# Patient Record
Sex: FEMALE | NOT HISPANIC OR LATINO | RURAL
[De-identification: names, ages, dates, MRNs, and addresses within clinical notes are randomized per-mention and may not be internally consistent; named-entity substitution may affect disease eponyms.]

---

## 2022-08-11 PROCEDURE — 88305 TISSUE EXAM BY PATHOLOGIST: CPT | Mod: SUR,59 | Performed by: DERMATOLOGY

## 2022-08-11 PROCEDURE — 88305 TISSUE EXAM BY PATHOLOGIST: CPT | Mod: 26,,, | Performed by: PATHOLOGY

## 2022-08-11 PROCEDURE — 88305 PATHOLOGY, DERMATOLOGY: ICD-10-PCS | Mod: 26,,, | Performed by: PATHOLOGY

## 2022-08-12 ENCOUNTER — LAB REQUISITION (OUTPATIENT)
Dept: LAB | Facility: HOSPITAL | Age: 47
End: 2022-08-12
Attending: DERMATOLOGY
Payer: MEDICARE

## 2022-08-12 DIAGNOSIS — D49.2 NEOPLASM OF UNSPECIFIED BEHAVIOR OF BONE, SOFT TISSUE, AND SKIN: ICD-10-CM

## 2022-08-12 DIAGNOSIS — L53.8 OTHER SPECIFIED ERYTHEMATOUS CONDITIONS: ICD-10-CM

## 2022-08-15 LAB
DHEA SERPL-MCNC: NORMAL
ESTROGEN SERPL-MCNC: NORMAL PG/ML
INSULIN SERPL-ACNC: NORMAL U[IU]/ML
LAB AP GROSS DESCRIPTION: NORMAL
LAB AP LABORATORY NOTES: NORMAL
LAB AP SPEC A DDX: NORMAL
LAB AP SPEC A MORPHOLOGY: NORMAL
LAB AP SPEC A PROCEDURE: NORMAL
LAB AP SPEC B DDX: NORMAL
LAB AP SPEC B MORPHOLOGY: NORMAL
LAB AP SPEC B PROCEDURE: NORMAL
T3RU NFR SERPL: NORMAL %

## 2022-08-16 DIAGNOSIS — G35 MULTIPLE SCLEROSIS: Primary | ICD-10-CM

## 2022-08-30 ENCOUNTER — CLINICAL SUPPORT (OUTPATIENT)
Dept: REHABILITATION | Facility: HOSPITAL | Age: 47
End: 2022-08-30
Payer: MEDICARE

## 2022-08-30 DIAGNOSIS — G35 MULTIPLE SCLEROSIS: ICD-10-CM

## 2022-08-30 PROCEDURE — 97542 WHEELCHAIR MNGMENT TRAINING: CPT

## 2022-08-30 NOTE — PLAN OF CARE
"[facility]  ASSISTIVE TECHNOLOGY EVALUATION    Date: 8/30/2022   Name: Alexus Mathew  Clinic Number: [id number]    Therapy Diagnosis:   Encounter Diagnosis   Name Primary?    Multiple sclerosis      Physician: Supriya Chacon MD    Physician Orders: PT Eval and Treat for wheelchair  Medical Diagnosis from Referral: MS  Evaluation Date: 8/30/2022  Visit # / Visits authorized: 1/ 1    Time In: 1100  Time Out: 1210  Total Appointment Time (timed & untimed codes): 70 minutes    Precautions: Standard    Past Medical History  Multiple sclerosis diagnosed in 2005, Brain surgery in 2004 due to her falling and balance issues, her function has signficantly decline over the past 3-5 years,  She is having multiple falls with associated injuries, back surgery lumbar fusion n 2020 with wound healing problems after surgery, Covid in 2020 with residual fatigue, rotator cuff tear due to fall resulting in surgery, meniscal tear due to fall with associated surgery,  Raynauds in her right foot resulting in edema in her right LE    Pain at time of evaluation: 4/10 Tolerable  Pain at worst: 10/10 Worst Pain Imaginable  Pain at rest: 3/10 Tolerable    Factors that increase pain:   standing and walking    Factors that decrease pain:  rest    Current Durable Medical Equipment  MWC, POV, rollator, grab bars, BSC,     Home environment  Patient lives with her  in a tradtitional home with a threshold at entrance, and tile floors with carpet in bedrooms,   Interior door width: 36"  Exterior door width: 36"  Comments: patient built home mostly handicapped accessible     Patient's mobility complaints: patient with very frequent falls with near falls every single day.  She cannot push herself in a manual wheelchair due to UE weakness with the right arm weaker than her left. She is unsafe with her transfers and is fearful of trying to get up alone, but her  works outside of the home and she is alone for a portion of " the day every weekday.  She cannot get to the bathroom in time to prevent accidents many times, and she delays trying to go to the bathroom because of fear of falling with standing and transfers.     Objective Evaluation:    ROM Right upper extremity  Left upper extremity   Right lower extremity  Left lower extremity    Shoulder flexion  95 120 Hip flexion 95 100   Shoulder IR/ER WFL WFL Hip extension  0 0   Shoulder ABD 90 120 Knee extension  0 0   Elbow flexion/ext WFL WFL Knee flexion  100 100   Wrist flexion/ext WFL WFL Ankle DF 0 10   Finger flexion/ext WFL WFL Ankle PF 30 30             Strength Right upper extremity  Left upper extremity   Right lower extremity  Left lower extremity    Shoulder flex -3/5 +3/5 Hip flexion  +2/5 3/5   Shoulder IR/ER -3/5 +3/5 Hip extension  -3/5 3/5   Shoulder ABD -3/5 +3/5 Knee extension  +2/5 3/5   Elbow flexion/ext 3/5 +3/5 Knee flexion  +2/5 3/5   Wrist flexion/ext -3/5 +3/5 Ankle DF -2/5 3/5   Finger flexion/ext -3/5 +3/5 Ankle PF -3/5 3/5             Sensation: decreased sensation in LE's    History of pressure sores: not at this time, but at high risk due to decline in sensation and decline in functional weight shifting over the past 1-2 years.     Transfers:  Sit to/from Stand  Contact Guard Assistance  Stand Pivot Transfer Minimal Assistance  Supine to/from Sit Contact Guard Assistance    Cognitive Status: WFL    Activities of Daily Living:  Feeding: Independent  Dressing: Moderate Assistance  Bathing: Minimal Assistance  Toileting: Minimal Assistance    Balance Assessment:  Static Sit Independent  Dynamic Sit Modified Independent  Static Stand Contact Guard Assistance  Dynamic Stand Minimal Assistance    Postural Assessment:  Head and Neck: forward head posture  Upper Extremities:  mild protracted scapulae  Trunk: decreased lumbar lordosis  Pelvis: posterior pelvic tilt at rest  Lower Extremities: LE's rest in neutral in sitting.     Spasticity: decreased tone noted  in right LE    Gait Analysis: patient took 2 steps during stand pivot transfer with min to [facility] with poor foot clearance and very unsteady standing balance.     Body Measurements(all in inches):  Seat to top of head 31   Hip width 21   Chest width 12   Shoulder width 21   Outer knee 16   Inner knee NT     Right  Left  Shoulder Height 22.5 22.5   Axilla to seat 16.5 17   Elbow to seat 8.5 9   Thigh Length 20 20   Lower leg length 18.5 18     DME Provider: patient has chosen to use [employer] for her equipment needs    Nutrition: WFL  Weight loss/gain in past 2 months: no  Difficulty swallowing: no    Assessment and Recommendations:    A walker will not meet this patient's mobility needs secondary to she is not safe to ambulate with any type of gait assistive device due to history of severe falls with injuries and associated surgeries when using a RW.    A manual wheelchair will not meet this patient's mobility needs because she does not have the UE strength to self propel even an optimally configured MWC and her diagnosis is progressive which will mean her strength will continue to decline.    A power wheelchair is needed to meet this patient's mobility needs because: She will be able to safely complete her household mobility independently with less risk of fall and injury.    The wheelchair recommended is: I recommend a [employer] M3 PWC base to provide her a small turning radius for household mobility and she demonstrated the ability to safely and independently drive this type of PWC during my evaluation. She is also in need of the following power seat functions:   Power tilt and power recline are necessary to provide her an independent method of pressure relief.  She is having a significantly harder time completing effective weight shifts and transfers over the past 1-2 years and her diagnosis will continue to progress which will cause progressive strength loss and she is at high risk of skin breakdown due  to this progressive difficulty.  She also reports some urinary incontinence due to muscle weakness which this moisture will exacerbate her skin breakdown if she is not provided an independent method of pressure relief.   Due to her Raynaud's disease and chronic right leg and foot edema, she is in need of power elevating leg rests. She cannot independently reposition her right leg at this time due to her profound weakness in that leg and it is imperative that she can reposition her LE's for edema control.    The final power seat function I recommend is power seat elevate.  She is spending a signficant amount of time at home alone due to her 's work schedule and currently she cannot reach certain surfaces independently due to risk of fall and difficulty with safe transfers.  She also has difficulty with transfers when the surface is too low for her to independently perform sit to stand from.  This power seat elevate will give her the independent and safe method of changing her seat to floor height so that she can perform her transfers with improved safety and less risk of fall as well as allow her to reach varying surface heights in her home for meal prep and ADL's especially when her  is at work and she is home alone.   To provide her the necessary pelvic and LE positioning as well as skin protection I recommend the [employer] cushion. We performed a trial in this specific cushion today and she demonstrated improved pelvic positioning.  To ensure she has the trunk support needed for her postural support now as well as over the upcoming 3-5 years as her disease process advances I recommend a curved Corpus backrest, and she also performed a trial in this backrest today with improved spinal alignment. I recommend a left side joystick with retractable mount so that she can independently operate the [facility] with her stronger hand while also being able to retract the mount when needed for safe transfers and table  access for meals.   In order to provide the anterior pelvic stability and trunk support she needs she will also need a pelvic belt and chest strap.  She will require a headrest with mounting hardware for the cervical and head support needed especially when using the tilt and recline for pressure relief.  I strongly feel this is the most appropriate PWC configuration for her at this time to meet all of her mobility, positioning, and pressure relieving needs at this time and it will continue to meet her needs over the upcoming 5 years as her disease process progresses and she loses functional abilities.     Plan:  Patient will return to clinic for one additional visit for a formal fitting and training session when this PWC is delivered.         Electronically signed by:  CARTER JESUS, PT, ATP  8/30/22    I CERTIFY THE NEED FOR THESE SERVICES FURNISHED UNDER THIS PLAN OF TREATMENT AND WHILE UNDER MY CARE.    Physician's comments:      Physician's Signature: _________________________________________  Date: __________________________  .